# Patient Record
Sex: FEMALE | Race: WHITE
[De-identification: names, ages, dates, MRNs, and addresses within clinical notes are randomized per-mention and may not be internally consistent; named-entity substitution may affect disease eponyms.]

---

## 2018-03-10 ENCOUNTER — HOSPITAL ENCOUNTER (EMERGENCY)
Dept: HOSPITAL 45 - C.EDB | Age: 28
Discharge: HOME | End: 2018-03-10
Payer: COMMERCIAL

## 2018-03-10 VITALS
BODY MASS INDEX: 21.49 KG/M2 | BODY MASS INDEX: 21.49 KG/M2 | WEIGHT: 128.97 LBS | WEIGHT: 128.97 LBS | HEIGHT: 65 IN | HEIGHT: 65 IN

## 2018-03-10 VITALS — DIASTOLIC BLOOD PRESSURE: 54 MMHG | HEART RATE: 62 BPM | OXYGEN SATURATION: 97 % | SYSTOLIC BLOOD PRESSURE: 96 MMHG

## 2018-03-10 VITALS — TEMPERATURE: 98.42 F

## 2018-03-10 DIAGNOSIS — D72.829: ICD-10-CM

## 2018-03-10 DIAGNOSIS — R10.31: Primary | ICD-10-CM

## 2018-03-10 LAB
ALBUMIN SERPL-MCNC: 3.2 GM/DL (ref 3.4–5)
ALP SERPL-CCNC: 41 U/L (ref 45–117)
ALT SERPL-CCNC: 30 U/L (ref 12–78)
AST SERPL-CCNC: 16 U/L (ref 15–37)
BASOPHILS # BLD: 0.02 K/UL (ref 0–0.2)
BASOPHILS NFR BLD: 0.2 %
BUN SERPL-MCNC: 11 MG/DL (ref 7–18)
CALCIUM SERPL-MCNC: 8.7 MG/DL (ref 8.5–10.1)
CO2 SERPL-SCNC: 25 MMOL/L (ref 21–32)
CREAT SERPL-MCNC: 0.66 MG/DL (ref 0.6–1.2)
EOS ABS #: 0.15 K/UL (ref 0–0.5)
EOSINOPHIL NFR BLD AUTO: 308 K/UL (ref 130–400)
GLUCOSE SERPL-MCNC: 92 MG/DL (ref 70–99)
HCT VFR BLD CALC: 39.2 % (ref 37–47)
HGB BLD-MCNC: 13.9 G/DL (ref 12–16)
IG#: 0.03 K/UL (ref 0–0.02)
IMM GRANULOCYTES NFR BLD AUTO: 15.9 %
LIPASE: 91 U/L (ref 73–393)
LYMPHOCYTES # BLD: 2.01 K/UL (ref 1.2–3.4)
MCH RBC QN AUTO: 31.1 PG (ref 25–34)
MCHC RBC AUTO-ENTMCNC: 35.5 G/DL (ref 32–36)
MCV RBC AUTO: 87.7 FL (ref 80–100)
MONO ABS #: 1 K/UL (ref 0.11–0.59)
MONOCYTES NFR BLD: 7.9 %
NEUT ABS #: 9.47 K/UL (ref 1.4–6.5)
NEUTROPHILS # BLD AUTO: 1.2 %
NEUTROPHILS NFR BLD AUTO: 74.6 %
PMV BLD AUTO: 10.1 FL (ref 7.4–10.4)
POTASSIUM SERPL-SCNC: 3.6 MMOL/L (ref 3.5–5.1)
PROT SERPL-MCNC: 7.4 GM/DL (ref 6.4–8.2)
RED CELL DISTRIBUTION WIDTH CV: 12.5 % (ref 11.5–14.5)
RED CELL DISTRIBUTION WIDTH SD: 40.6 FL (ref 36.4–46.3)
SODIUM SERPL-SCNC: 137 MMOL/L (ref 136–145)
WBC # BLD AUTO: 12.68 K/UL (ref 4.8–10.8)

## 2018-03-10 NOTE — DIAGNOSTIC IMAGING REPORT
ABDOMEN AND PELVIS CT WITH IV AND ORAL CONTRAST



CT DOSE: 280.61 mGy.cm



HISTORY: Acute right lower quadrant abdominal pain  RLQ pain



TECHNIQUE: Multiaxial CT images of the abdomen and pelvis were performed

following the use of intravenous and oral contrast.  A dose lowering technique

was utilized adhering to the principles of ALARA.



COMPARISON STUDY: None.



FINDINGS: 

 Minimal dependent bibasilar atelectasis. There is no pneumatosis or

pneumoperitoneum identified. The imaged inferior cardiac chambers are

unremarkable.



The liver, spleen, pancreas, adrenal glands and gallbladder are unremarkable.

External renal pelvis noted bilaterally. There is bilateral urothelial

enhancement involving the calyces, renal pelvis and ureters with mild

surrounding inflammatory stranding. No obstructive uropathy. Mild urinary

bladder distention. Trace free pelvic fluid, likely physiologic. Uterus and

adnexa are unremarkable. Aorta is normal in course and caliber. There is no

bulky adenopathy.



There is no bowel obstruction or focal bowel wall thickening identified. The

appendix is partially visualized within the abdominal right lower quadrant and

is air filled appearing noninflamed and nondilated. No right lower quadrant

inflammatory changes identified. Soft tissues are unremarkable. Bones appear

intact.



IMPRESSION: 



1. Bilateral urothelial enhancement of the calyces, renal pelves and ureters

with mild associated inflammatory stranding and no obstructive uropathy or renal

calculi suggests ureteritis/pyelitis from ascending infectious etiology.

Correlate with urinalysis.

2. Visualized appendix appears normal without evidence of acute appendicitis.

3. No bowel obstruction or focal bowel wall thickening.

 







Electronically signed by:  Carter Connor M.D.

3/10/2018 7:53 AM



Dictated Date/Time:  3/10/2018 7:44 AM

## 2018-03-10 NOTE — EMERGENCY ROOM VISIT NOTE
History


First contact with patient:  02:52


Chief Complaint:  ABDOMINAL PAIN


Stated Complaint:  ABD PAIN


Nursing Triage Summary:  


Pt c/o right side abdominal pain starting tonight. Pt states she hasn't had BM 


in 4 days. Has tried laxatives with no relief.





History of Present Illness


The patient is a 27 year old female who presents to the Emergency Room with 

complaints of right-sided abdominal pain.  The patient states she has had some 

mild, generalized abdominal pain for the past 3-4 days.  She thought that she 

could be constipated and has been taking laxatives.  She did have one bowel 

movement after using a suppository.  The patient states that she woke up 

tonight and had pain in the right lower abdomen.  This is worse with movement 

of the abdomen.  She rates her discomfort as 7/10.  She has had no associated 

nausea/vomiting.  She has had a decreased appetite.  She states that she has 

had increased frequency of urination and increased urgency to urinate over the 

past week.  She was checked for a UTI 1 week ago but states it was negative.  

She does admit that she had an episode of passing out 1 week ago, but states 

that she was dehydrated and overheated when this occurred.  She has had no 

similar episodes since.  She denies vaginal discharge or chance of pregnancy.  

She recently traveled to the [facility] and states that she was there when her 

abdominal pain began.  She reports that she had a ruptured intestine 8 years 

ago.  She never had to have surgery for this and is not sure what the cause 

was.  She states her symptoms today do not feel similar to this.





Review of Systems


A complete 10 point review of systems was reviewed with the patient with 

pertinent positives and negatives as per history of present illness. All else 

were negative.





Past Medical/Surgical History


Medical Problems:


(1) No significant active problems








Social History


Smoking Status:  Never Smoker


Housing Status:  lives with family





Current/Historical Medications


Scheduled


Birth Control Pills (Birth Control Pills), 1 TAB PO DAILY


Cefdinir (Omnicef), 300 MG PO Q12H





Physical Exam


Vital Signs











  Date Time  Temp Pulse Resp B/P (MAP) Pulse Ox O2 Delivery O2 Flow Rate FiO2


 


3/10/18 08:16  62 16 96/54 97   


 


3/10/18 06:36  77 18 97/58 99 Room Air  


 


3/10/18 04:48  72 18 108/65 98 Room Air  


 


3/10/18 02:48 36.9 70 18 116/75 96 Room Air  











Physical Exam


VITALS: Vitals are noted on the nurse's note and reviewed by myself.  Vital 

signs stable.


GENERAL: This is a 27-year-old female, in no acute distress, nondiaphoretic, 

well-developed well-nourished.


SKIN: The skin was without rashes.


EYES: Pupils equal round and reactive to light and accommodation.  


MOUTH: Mucous membranes moist.  


HEART: Regular rate and rhythm without murmurs gallops or rubs.


LUNGS: Clear to auscultation bilaterally without wheezes, rales or rhonchi. 


ABDOMEN: Positive bowel sounds x 4.  Soft, nondistended.  There is tenderness 

to palpation in the right lower quadrant.  There is no guarding or rebound 

tenderness.  No palpable masses or organomegaly.


NEURO: Patient was alert and oriented to person place and time.





Medical Decision & Procedures


ER Provider


Diagnostic Interpretation:


ABDOMEN AND PELVIS CT WITH IV AND ORAL CONTRAST





FINDINGS: 


 Minimal dependent bibasilar atelectasis. There is no pneumatosis or


pneumoperitoneum identified. The imaged inferior cardiac chambers are


unremarkable.





The liver, spleen, pancreas, adrenal glands and gallbladder are unremarkable.


External renal pelvis noted bilaterally. There is bilateral urothelial


enhancement involving the calyces, renal pelvis and ureters with mild


surrounding inflammatory stranding. No obstructive uropathy. Mild urinary


bladder distention. Trace free pelvic fluid, likely physiologic. Uterus and


adnexa are unremarkable. Aorta is normal in course and caliber. There is no


bulky adenopathy.





There is no bowel obstruction or focal bowel wall thickening identified. The


appendix is partially visualized within the abdominal right lower quadrant and


is air filled appearing noninflamed and nondilated. No right lower quadrant


inflammatory changes identified. Soft tissues are unremarkable. Bones appear


intact.





IMPRESSION: 





1. Bilateral urothelial enhancement of the calyces, renal pelves and ureters


with mild associated inflammatory stranding and no obstructive uropathy or renal


calculi suggests ureteritis/pyelitis from ascending infectious etiology.


Correlate with urinalysis.


2. Visualized appendix appears normal without evidence of acute appendicitis.


3. No bowel obstruction or focal bowel wall thickening.





Laboratory Results


3/10/18 03:22








Red Blood Count 4.47, Mean Corpuscular Volume 87.7, Mean Corpuscular Hemoglobin 

31.1, Mean Corpuscular Hemoglobin Concent 35.5, Mean Platelet Volume 10.1, 

Neutrophils (%) (Auto) 74.6, Lymphocytes (%) (Auto) 15.9, Monocytes (%) (Auto) 

7.9, Eosinophils (%) (Auto) 1.2, Basophils (%) (Auto) 0.2, Neutrophils # (Auto) 

9.47, Lymphocytes # (Auto) 2.01, Monocytes # (Auto) 1.00, Eosinophils # (Auto) 

0.15, Basophils # (Auto) 0.02





3/10/18 03:22

















Test


  3/10/18


03:22 3/10/18


05:03


 


White Blood Count


  12.68 K/uL


(4.8-10.8) 


 


 


Red Blood Count


  4.47 M/uL


(4.2-5.4) 


 


 


Hemoglobin


  13.9 g/dL


(12.0-16.0) 


 


 


Hematocrit 39.2 % (37-47)  


 


Mean Corpuscular Volume


  87.7 fL


() 


 


 


Mean Corpuscular Hemoglobin


  31.1 pg


(25-34) 


 


 


Mean Corpuscular Hemoglobin


Concent 35.5 g/dl


(32-36) 


 


 


Platelet Count


  308 K/uL


(130-400) 


 


 


Mean Platelet Volume


  10.1 fL


(7.4-10.4) 


 


 


Neutrophils (%) (Auto) 74.6 %  


 


Lymphocytes (%) (Auto) 15.9 %  


 


Monocytes (%) (Auto) 7.9 %  


 


Eosinophils (%) (Auto) 1.2 %  


 


Basophils (%) (Auto) 0.2 %  


 


Neutrophils # (Auto)


  9.47 K/uL


(1.4-6.5) 


 


 


Lymphocytes # (Auto)


  2.01 K/uL


(1.2-3.4) 


 


 


Monocytes # (Auto)


  1.00 K/uL


(0.11-0.59) 


 


 


Eosinophils # (Auto)


  0.15 K/uL


(0-0.5) 


 


 


Basophils # (Auto)


  0.02 K/uL


(0-0.2) 


 


 


RDW Standard Deviation


  40.6 fL


(36.4-46.3) 


 


 


RDW Coefficient of Variation


  12.5 %


(11.5-14.5) 


 


 


Immature Granulocyte % (Auto) 0.2 %  


 


Immature Granulocyte # (Auto)


  0.03 K/uL


(0.00-0.02) 


 


 


Anion Gap


  8.0 mmol/L


(3-11) 


 


 


Est Creatinine Clear Calc


Drug Dose 114.2 ml/min 


  


 


 


Estimated GFR (


American) 139.3 


  


 


 


Estimated GFR (Non-


American 120.2 


  


 


 


BUN/Creatinine Ratio 16.8 (10-20)  


 


Calcium Level


  8.7 mg/dl


(8.5-10.1) 


 


 


Total Bilirubin


  0.4 mg/dl


(0.2-1) 


 


 


Aspartate Amino Transf


(AST/SGOT) 16 U/L (15-37) 


  


 


 


Alanine Aminotransferase


(ALT/SGPT) 30 U/L (12-78) 


  


 


 


Alkaline Phosphatase


  41 U/L


() 


 


 


Total Protein


  7.4 gm/dl


(6.4-8.2) 


 


 


Albumin


  3.2 gm/dl


(3.4-5.0) 


 


 


Globulin


  4.2 gm/dl


(2.5-4.0) 


 


 


Albumin/Globulin Ratio 0.8 (0.9-2)  


 


Lipase


  91 U/L


() 


 


 


Urine Color  YELLOW 


 


Urine Appearance  CLEAR (CLEAR) 


 


Urine pH  7.0 (4.5-7.5) 


 


Urine Specific Gravity


  


  1.007


(1.000-1.030)


 


Urine Protein  1+ (NEG) 


 


Urine Glucose (UA)  NEG (NEG) 


 


Urine Ketones  NEG (NEG) 


 


Urine Occult Blood  NEG (NEG) 


 


Urine Nitrite  NEG (NEG) 


 


Urine Bilirubin  NEG (NEG) 


 


Urine Urobilinogen  NEG (NEG) 


 


Urine Leukocyte Esterase  MODERATE (NEG) 


 


Urine WBC (Auto)


  


  10-30 /hpf


(0-5)


 


Urine RBC (Auto)  0-4 /hpf (0-4) 


 


Urine Hyaline Casts (Auto)  1-5 /lpf (0-5) 


 


Urine Epithelial Cells (Auto)


  


  10-20 /lpf


(0-5)


 


Urine Bacteria (Auto)  NEG (NEG) 


 


Urine Pregnancy Test  NEG (NEG) 











Medications Administered











 Medications


  (Trade)  Dose


 Ordered  Sig/Javad


 Route  Start Time


 Stop Time Status Last Admin


Dose Admin


 


 Ondansetron HCl


  (Zofran Inj)  4 mg  NOW  STAT


 IV  3/10/18 03:09


 3/10/18 03:12 DC 3/10/18 03:29


4 MG











Medical Decision


Differential diagnosis includes appendicitis, constipation, gastroenteritis, 

colitis, UTI, ovarian cyst, ovarian torsion, among others.





The patient is a 27-year-old female who presents today complaining of right-

sided abdominal pain.  Labs revealed leukocytosis of 12.6.  No concerning 

electrolyte abnormalities.  LFTs within normal limits.  Creatinine within 

normal limits.  CT showed possible evidence of ureteritis/pyelitis.  Patient 

does report urinary symptoms recently.  Her urinalysis shows moderate leukocyte 

esterase, 10-30 white blood cells and a small amount of epithelial cells.  

Patient will be treated on Omnicef given CT findings.  Constipation treatment 

was discussed with the patient.  She will follow-up with her primary care 

provider this week for a recheck or return here for worsening or new/concerning 

symptoms.





Based on the patient's presentation and work up, I feel the patient is stable 

for outpatient treatment.  The patient was educated to return to the emergency 

department for any worsening of their current condition or new/concerning 

symptoms.  She will follow up with her PCP.





Medication Reconcilliation


Current Medication List:  was personally reviewed by me





Blood Pressure Screening


Patient's blood pressure:  Normal blood pressure





Impression





 Primary Impression:  


 Right lower quadrant abdominal pain





Departure Information


Dispostion


Home / Self-Care





Condition


GOOD





Prescriptions





Cefdinir (OMNICEF) 300 Mg Cap


300 MG PO Q12H for 10 Days, #20 CAP


   Prov: Xochilt Jordan ., DINA         3/10/18





Referrals


[facility] (PCP)





Patient Instructions


My [facility]





Additional Instructions





You have been treated in the Emergency Department for your Abdominal Pain. 

Laboratory results and imaging studies have ruled out any emergent causes for 

your abdominal pain which would warrant admission or surgery. 





You were prescribed Omnicef to be taken twice daily as prescribed. This is an 

antibiotic. All antibiotics have the potential to cause diarrhea. Stop this 

medication and contact a medical provider if you were to develop any 

significant adverse side effects including: wheezing, shortness of breath, 

passing out, vomiting, or a diffuse rash. Always take antibiotics as directed 

and COMPLETE the ENTIRE course regardless of the improvement of your symptoms.





For pain control, you can use the following over-the-counter medicines (if >13 yo):





- Regular strength (325mg/tab) Tylenol (acetaminophen) 2 tabs every 4-6 hours 

as needed. Do not exceed 12 tablets in a 24 hour period. Avoid taking more than 

4 grams (4000 mg) of Tylenol per day. This includes any other sources of 

acetaminophen you may take on a regular basis.





- Regular strength (200 mg/tab) Advil (ibuprofen) 1-2 tabs every 4-6 hours as 

needed. Do not exceed a dose of 3200 mg per day.





Drink plenty of water and stay well-hydrated.





You may use Magnesium citrate for your constipation.  This can be purchased 

over the counter.





Return to the emergency department if your symptoms persist despite treatment 

plan outlined above or if the following symptoms occur: worsening pain, vomiting

, fevers, or any other new/concerning symptoms.